# Patient Record
Sex: MALE | Race: WHITE
[De-identification: names, ages, dates, MRNs, and addresses within clinical notes are randomized per-mention and may not be internally consistent; named-entity substitution may affect disease eponyms.]

---

## 2019-06-25 ENCOUNTER — HOSPITAL ENCOUNTER (EMERGENCY)
Dept: HOSPITAL 25 - ED | Age: 54
Discharge: HOME | End: 2019-06-25
Payer: COMMERCIAL

## 2019-06-25 VITALS — DIASTOLIC BLOOD PRESSURE: 103 MMHG | SYSTOLIC BLOOD PRESSURE: 136 MMHG

## 2019-06-25 DIAGNOSIS — M47.896: ICD-10-CM

## 2019-06-25 DIAGNOSIS — M51.36: ICD-10-CM

## 2019-06-25 DIAGNOSIS — M54.9: Primary | ICD-10-CM

## 2019-06-25 DIAGNOSIS — M51.34: ICD-10-CM

## 2019-06-25 DIAGNOSIS — Z87.891: ICD-10-CM

## 2019-06-25 PROCEDURE — 72110 X-RAY EXAM L-2 SPINE 4/>VWS: CPT

## 2019-06-25 PROCEDURE — 72070 X-RAY EXAM THORAC SPINE 2VWS: CPT

## 2019-06-25 PROCEDURE — 96372 THER/PROPH/DIAG INJ SC/IM: CPT

## 2019-06-25 PROCEDURE — 99282 EMERGENCY DEPT VISIT SF MDM: CPT

## 2019-06-25 NOTE — ED
Back Pain





- HPI Summary


HPI Summary: 


54 year old male presents with back pain for the past week.  He denies any 

injury.  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 

he started climbing a ladder.  He denies any numbness or tingling.  No 

weakness.  No loss of bowel or bladder.  No saddle anesthesia.  This pain feels 

like spasm.  He states pain started in his lower back and moved up into 

midback.  Denies any chest pain or shortness breath.  Pain does not radiate 

into the chest or abdomen.  took alleve with minimal relief. 





- History of Current Complaint


Chief Complaint: EDBackInjuryPain


Stated Complaint: BACK PAIN PER PT


Time Seen by Provider: 06/25/19 08:15


Pain Intensity: 7





- Allergies/Home Medications


Allergies/Adverse Reactions: 


 Allergies











Allergy/AdvReac Type Severity Reaction Status Date / Time


 


No Known Allergies Allergy   Verified 06/25/19 08:08














PMH/Surg Hx/FS Hx/Imm Hx


Endocrine/Hematology History: 


   Denies: Hx Anticoagulant Therapy


Respiratory History: 


   Denies: Hx Asthma


Infectious Disease History: No


Infectious Disease History: 


   Denies: Traveled Outside the [place] in Last 30 Days





- Family History


Known Family History: Positive: Non-Contributory





- Social History


Alcohol Use: Occasionally


Substance Use Type: Reports: None


Smoking Status (MU): Former Smoker





Review of Systems


Negative: Fever


Negative: Chest Pain


Negative: Shortness Of Breath


Positive: Myalgia - back pain


All Other Systems Reviewed And Are Negative: Yes





Physical Exam


Triage Information Reviewed: Yes


Vital Signs On Initial Exam: 


 Initial Vitals











Temp Pulse Resp BP Pulse Ox


 


 97.9 F   98   20   160/97   98 


 


 06/25/19 08:07  06/25/19 08:07  06/25/19 08:07  06/25/19 08:07  06/25/19 08:07











Vital Signs Reviewed: Yes


Appearance: Positive: Well-Appearing


Skin: Positive: Warm, Dry


Head/Face: Positive: Normal Head/Face Inspection


Eyes: Positive: Normal, Conjunctiva Clear


ENT: Positive: Pharynx normal


Respiratory/Lung Sounds: Positive: Clear to Auscultation, Breath Sounds Present


Cardiovascular: Positive: Normal, RRR


Musculoskeletal: Positive: Strength/ROM Intact - back with pain, Other - 

tenderness over right side of midback, neg SLR, good pulses, sensation grossly 

intact


Neurological: Positive: Normal, Reflexes Intact - patella, Normal Gait


Psychiatric: Positive: Normal





Diagnostics





- Vital Signs


 Vital Signs











  Temp Pulse Resp BP Pulse Ox


 


 06/25/19 08:07  97.9 F  98  20  160/97  98














- Laboratory


Lab Statement: Any lab studies that have been ordered have been reviewed, and 

results considered in the medical decision making process.





- Radiology


  ** lumbar


Radiology Interpretation Completed By: Radiologist


Summary of Radiographic Findings: IMPRESSION:  MILD DEGENERATIVE DISC DISEASE 

AND OSTEOARTHRITIS MOST PRONOUNCED ALONG THE LOWER LUMBAR.  SPINE.





  ** thoracic


Radiology Interpretation Completed By: Radiologist


Summary of Radiographic Findings: IMPRESSION:  DEGENERATIVE DISC DISEASE.





Back Pain Course/Dx





- Course


Course Of Treatment: 54 year old male presents with back pain for the past 

week.  He denies any injury.  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 

the pain started when he started climbing a ladder.  He denies any numbness or 

tingling.  No weakness.  No loss of bowel or bladder.  No saddle anesthesia.  

This pain feels like spasm.  He states pain started in his lower back and moved 

up into midback.  Denies any chest pain or shortness breath.  Pain does not 

radiate into the chest or abdomen.  took alleve with minimal relief.  on exam 

has tenderness right midback. neurovascular intact. xray shows no fracture. 

gave norflex and feeling.  will prescribe flexeril and medrol. told follow up 

with primary. patient understand and agrees with plan.





- Diagnoses


Differential Diagnosis/HQI/PQRI: Positive: Fracture, Herniated Disc, Strain


Provider Diagnoses: 


 Back pain








Discharge





- Sign-Out/Discharge


Documenting (check all that apply): Patient Departure


Patient Received Moderate/Deep Sedation with Procedure: No





- Discharge Plan


Condition: Good


Disposition: HOME


Patient Education Materials:  Back Pain (ED)


Referrals: 


[facility] [place] PHYSICIAN REFERRAL [Outside]


Additional Instructions: 


Follow directions on package for Medrol pack


Take muscle relaxers three times a day 


Use ibuprofen or Tylenol for pain every 6 hours


ice/heat area, move as much as possible 


Establish care with primary


Return to ED if develop any new or worsening symptoms





- Billing Disposition and Condition


Condition: GOOD


Disposition: Home